# Patient Record
Sex: FEMALE | Race: WHITE | NOT HISPANIC OR LATINO | Employment: OTHER | URBAN - METROPOLITAN AREA
[De-identification: names, ages, dates, MRNs, and addresses within clinical notes are randomized per-mention and may not be internally consistent; named-entity substitution may affect disease eponyms.]

---

## 2022-04-07 ENCOUNTER — HISTORICAL (OUTPATIENT)
Dept: ADMINISTRATIVE | Facility: HOSPITAL | Age: 67
End: 2022-04-07
Payer: MEDICARE

## 2022-04-24 VITALS
SYSTOLIC BLOOD PRESSURE: 128 MMHG | WEIGHT: 260.56 LBS | DIASTOLIC BLOOD PRESSURE: 70 MMHG | BODY MASS INDEX: 43.41 KG/M2 | HEIGHT: 65 IN

## 2024-02-09 ENCOUNTER — HOSPITAL ENCOUNTER (EMERGENCY)
Facility: HOSPITAL | Age: 69
Discharge: HOME | End: 2024-02-09
Attending: EMERGENCY MEDICINE
Payer: MEDICARE

## 2024-02-09 VITALS
TEMPERATURE: 97 F | HEIGHT: 63 IN | BODY MASS INDEX: 44.97 KG/M2 | OXYGEN SATURATION: 98 % | SYSTOLIC BLOOD PRESSURE: 130 MMHG | RESPIRATION RATE: 18 BRPM | DIASTOLIC BLOOD PRESSURE: 75 MMHG | WEIGHT: 253.81 LBS | HEART RATE: 75 BPM

## 2024-02-09 DIAGNOSIS — S42.215A CLOSED NONDISPLACED FRACTURE OF SURGICAL NECK OF LEFT HUMERUS, UNSPECIFIED FRACTURE MORPHOLOGY, INITIAL ENCOUNTER: Primary | ICD-10-CM

## 2024-02-09 PROCEDURE — 99284 EMERGENCY DEPT VISIT MOD MDM: CPT | Mod: 25

## 2024-02-09 PROCEDURE — 25000003 PHARM REV CODE 250: Performed by: NURSE PRACTITIONER

## 2024-02-09 RX ORDER — HYDROCODONE BITARTRATE AND ACETAMINOPHEN 10; 325 MG/1; MG/1
1 TABLET ORAL EVERY 6 HOURS PRN
Qty: 28 TABLET | Refills: 0 | Status: SHIPPED | OUTPATIENT
Start: 2024-02-09 | End: 2024-02-09

## 2024-02-09 RX ORDER — ONDANSETRON 4 MG/1
4 TABLET, ORALLY DISINTEGRATING ORAL
Status: COMPLETED | OUTPATIENT
Start: 2024-02-09 | End: 2024-02-09

## 2024-02-09 RX ORDER — METHOCARBAMOL 500 MG/1
1000 TABLET, FILM COATED ORAL 2 TIMES DAILY PRN
Qty: 20 TABLET | Refills: 0 | Status: SHIPPED | OUTPATIENT
Start: 2024-02-09 | End: 2024-02-14

## 2024-02-09 RX ORDER — HYDROCODONE BITARTRATE AND ACETAMINOPHEN 5; 325 MG/1; MG/1
1 TABLET ORAL
Status: COMPLETED | OUTPATIENT
Start: 2024-02-09 | End: 2024-02-09

## 2024-02-09 RX ORDER — ONDANSETRON 4 MG/1
4 TABLET, ORALLY DISINTEGRATING ORAL EVERY 8 HOURS PRN
Qty: 15 TABLET | Refills: 0 | Status: SHIPPED | OUTPATIENT
Start: 2024-02-09 | End: 2024-02-14

## 2024-02-09 RX ORDER — HYDROCODONE BITARTRATE AND ACETAMINOPHEN 10; 325 MG/1; MG/1
1 TABLET ORAL EVERY 6 HOURS PRN
Qty: 28 TABLET | Refills: 0 | Status: SHIPPED | OUTPATIENT
Start: 2024-02-09 | End: 2024-02-16

## 2024-02-09 RX ADMIN — HYDROCODONE BITARTRATE AND ACETAMINOPHEN 1 TABLET: 5; 325 TABLET ORAL at 10:02

## 2024-02-09 RX ADMIN — ONDANSETRON 4 MG: 4 TABLET, ORALLY DISINTEGRATING ORAL at 10:02

## 2024-02-09 NOTE — ED PROVIDER NOTES
Encounter Date: 2/9/2024       History     Chief Complaint   Patient presents with    Fall     Pt stated that she tripped and fell earlier this morning. Presents to ED with complaints of left shoulder pain with decreased ROM. Denied LOC - no neck /back pain      This is a 68-year-old white female with a history of hypertension and diabetes mellitus type 2 who presents to the emergency department with complaints of left shoulder pain that began after sustaining a fall just prior to arrival.  Patient reports bending forward to catch her dog, causing her to lose her balance and fall on the left shoulder.  She reports constant pain to the left shoulder associated with difficulty moving the joint as well.  She states pain radiates down into the left upper extremity, however denies numbness or tingling.  She denies head injury, loss of consciousness, neck pain, or any other relative symptoms at this time.      Review of patient's allergies indicates:  No Known Allergies  Past Medical History:   Diagnosis Date    Diabetes mellitus     Hypertension      No past surgical history on file.  No family history on file.     Review of Systems   Musculoskeletal:  Positive for arthralgias. Negative for gait problem, joint swelling and neck pain.   Skin:  Negative for wound.   Neurological:  Negative for syncope and numbness.       Physical Exam     Initial Vitals [02/09/24 1015]   BP Pulse Resp Temp SpO2   138/80 94 18 98.6 °F (37 °C) 97 %      MAP       --         Physical Exam    Nursing note and vitals reviewed.  Constitutional: She appears well-developed and well-nourished. She is active. No distress.   HENT:   Head: Normocephalic and atraumatic.   Eyes: EOM are normal. Pupils are equal, round, and reactive to light.   Neck: Neck supple.   Normal range of motion.  Cardiovascular:  Normal rate.           Pulmonary/Chest: No respiratory distress.   Musculoskeletal:         General: Tenderness present.        Arms:       Cervical  back: Normal range of motion and neck supple.     Neurological: She is alert and oriented to person, place, and time. GCS score is 15. GCS eye subscore is 4. GCS verbal subscore is 5. GCS motor subscore is 6.   Skin: Skin is warm and dry. Capillary refill takes less than 2 seconds.   Psychiatric: She has a normal mood and affect. Her behavior is normal. Thought content normal.         ED Course   Procedures  Labs Reviewed - No data to display       Imaging Results              X-Ray Shoulder 2 or More Views Left (Final result)  Result time 02/09/24 10:42:37      Final result by Franci Frost MD (02/09/24 10:42:37)                   Narrative:    EXAMINATION:  XR SHOULDER COMPLETE 2 OR MORE VIEWS LEFT    CLINICAL HISTORY:  injury;    COMPARISON:  None.    FINDINGS:  There is a fracture of the humeral neck with compaction of the fracture.  There is acromioclavicular degenerative changes    IMPRESSION  Compaction fracture of the left humeral neck      Electronically signed by: Franci Frost MD  Date:    02/09/2024  Time:    10:42                                     Medications   HYDROcodone-acetaminophen 5-325 mg per tablet 1 tablet (1 tablet Oral Given 2/9/24 1043)   ondansetron disintegrating tablet 4 mg (4 mg Oral Given 2/9/24 1043)     Medical Decision Making  Amount and/or Complexity of Data Reviewed  Radiology: ordered.    Risk  Prescription drug management.               ED Course as of 02/09/24 1112   Fri Feb 09, 2024   1111 Left shoulder x-ray reveals humeral neck compaction fracture.  Sling applied here in ED and patient understands to follow-up with PCP for orthopedic referral. [CB]      ED Course User Index  [CB] Flor Kwan NP                           Clinical Impression:  Final diagnoses:  [S42.215A] Closed nondisplaced fracture of surgical neck of left humerus, unspecified fracture morphology, initial encounter (Primary)          ED Disposition Condition    Discharge Stable          ED  Prescriptions       Medication Sig Dispense Start Date End Date Auth. Provider    HYDROcodone-acetaminophen (NORCO)  mg per tablet Take 1 tablet by mouth every 6 (six) hours as needed for Pain. 28 tablet 2/9/2024 2/16/2024 Flor Kwan NP    ondansetron (ZOFRAN-ODT) 4 MG TbDL Take 1 tablet (4 mg total) by mouth every 8 (eight) hours as needed (nausea, vomiting). 15 tablet 2/9/2024 2/14/2024 Flor Kwan NP    methocarbamoL (ROBAXIN) 500 MG Tab Take 2 tablets (1,000 mg total) by mouth 2 (two) times daily as needed. 20 tablet 2/9/2024 2/14/2024 Flor Kwan NP          Follow-up Information       Follow up With Specialties Details Why Contact Info    Suleman Doherty MD Family Medicine Schedule an appointment as soon as possible for a visit in 3 days for re-evaluation of today's complaint [address]  [address], [address]  [facility] [address]  [phone]               Flor Kwan NP  02/09/24 7842

## 2024-02-09 NOTE — DISCHARGE INSTRUCTIONS
Maintain use of sling until follow-up with ortho.  Follow-up with your primary doctor next week for orthopedic referral.  Take pain medication as directed; you are encouraged to start by taking half of a pain pill.  Return the emergency department for worsening condition.